# Patient Record
Sex: FEMALE | Race: OTHER
[De-identification: names, ages, dates, MRNs, and addresses within clinical notes are randomized per-mention and may not be internally consistent; named-entity substitution may affect disease eponyms.]

---

## 2020-05-23 ENCOUNTER — HOSPITAL ENCOUNTER (EMERGENCY)
Dept: HOSPITAL 8 - ED | Age: 25
Discharge: HOME | End: 2020-05-23
Payer: MEDICAID

## 2020-05-23 VITALS — BODY MASS INDEX: 26.22 KG/M2 | HEIGHT: 59 IN | WEIGHT: 130.07 LBS

## 2020-05-23 VITALS — SYSTOLIC BLOOD PRESSURE: 116 MMHG | DIASTOLIC BLOOD PRESSURE: 68 MMHG

## 2020-05-23 DIAGNOSIS — R10.9: ICD-10-CM

## 2020-05-23 DIAGNOSIS — A74.9: Primary | ICD-10-CM

## 2020-05-23 DIAGNOSIS — N89.8: ICD-10-CM

## 2020-05-23 DIAGNOSIS — F17.200: ICD-10-CM

## 2020-05-23 DIAGNOSIS — Z20.2: ICD-10-CM

## 2020-05-23 DIAGNOSIS — R30.0: ICD-10-CM

## 2020-05-23 LAB
CLUE CELLS: (no result)
HCG UR SG: 1.02 (ref 1–1.03)
MICROSCOPIC: (no result)
T VAGINALIS RRNA GENITAL QL PROBE: (no result)
WET PREP WBCS: (no result)

## 2020-05-23 PROCEDURE — 99283 EMERGENCY DEPT VISIT LOW MDM: CPT

## 2020-05-23 PROCEDURE — 87491 CHLMYD TRACH DNA AMP PROBE: CPT

## 2020-05-23 PROCEDURE — 87210 SMEAR WET MOUNT SALINE/INK: CPT

## 2020-05-23 PROCEDURE — 81025 URINE PREGNANCY TEST: CPT

## 2020-05-23 PROCEDURE — 96372 THER/PROPH/DIAG INJ SC/IM: CPT

## 2020-05-23 PROCEDURE — 87591 N.GONORRHOEAE DNA AMP PROB: CPT

## 2020-05-23 PROCEDURE — 81003 URINALYSIS AUTO W/O SCOPE: CPT

## 2020-05-23 PROCEDURE — 87808 TRICHOMONAS ASSAY W/OPTIC: CPT

## 2020-12-11 ENCOUNTER — APPOINTMENT (OUTPATIENT)
Dept: RADIOLOGY | Facility: MEDICAL CENTER | Age: 25
End: 2020-12-11
Attending: EMERGENCY MEDICINE
Payer: COMMERCIAL

## 2020-12-11 ENCOUNTER — HOSPITAL ENCOUNTER (EMERGENCY)
Facility: MEDICAL CENTER | Age: 25
End: 2020-12-12
Attending: EMERGENCY MEDICINE
Payer: COMMERCIAL

## 2020-12-11 DIAGNOSIS — R10.30 LOWER ABDOMINAL PAIN: ICD-10-CM

## 2020-12-11 LAB
BASOPHILS # BLD AUTO: 0.6 % (ref 0–1.8)
BASOPHILS # BLD: 0.06 K/UL (ref 0–0.12)
EOSINOPHIL # BLD AUTO: 0.02 K/UL (ref 0–0.51)
EOSINOPHIL NFR BLD: 0.2 % (ref 0–6.9)
ERYTHROCYTE [DISTWIDTH] IN BLOOD BY AUTOMATED COUNT: 43.3 FL (ref 35.9–50)
HCT VFR BLD AUTO: 46.4 % (ref 37–47)
HGB BLD-MCNC: 15.3 G/DL (ref 12–16)
IMM GRANULOCYTES # BLD AUTO: 0.03 K/UL (ref 0–0.11)
IMM GRANULOCYTES NFR BLD AUTO: 0.3 % (ref 0–0.9)
LYMPHOCYTES # BLD AUTO: 2.84 K/UL (ref 1–4.8)
LYMPHOCYTES NFR BLD: 28.9 % (ref 22–41)
MCH RBC QN AUTO: 30.3 PG (ref 27–33)
MCHC RBC AUTO-ENTMCNC: 33 G/DL (ref 33.6–35)
MCV RBC AUTO: 91.9 FL (ref 81.4–97.8)
MONOCYTES # BLD AUTO: 0.48 K/UL (ref 0–0.85)
MONOCYTES NFR BLD AUTO: 4.9 % (ref 0–13.4)
NEUTROPHILS # BLD AUTO: 6.39 K/UL (ref 2–7.15)
NEUTROPHILS NFR BLD: 65.1 % (ref 44–72)
NRBC # BLD AUTO: 0 K/UL
NRBC BLD-RTO: 0 /100 WBC
PLATELET # BLD AUTO: 313 K/UL (ref 164–446)
PMV BLD AUTO: 9.7 FL (ref 9–12.9)
RBC # BLD AUTO: 5.05 M/UL (ref 4.2–5.4)
WBC # BLD AUTO: 9.8 K/UL (ref 4.8–10.8)

## 2020-12-11 PROCEDURE — 96375 TX/PRO/DX INJ NEW DRUG ADDON: CPT

## 2020-12-11 PROCEDURE — 80053 COMPREHEN METABOLIC PANEL: CPT

## 2020-12-11 PROCEDURE — 84703 CHORIONIC GONADOTROPIN ASSAY: CPT

## 2020-12-11 PROCEDURE — 83690 ASSAY OF LIPASE: CPT

## 2020-12-11 PROCEDURE — 85025 COMPLETE CBC W/AUTO DIFF WBC: CPT

## 2020-12-11 PROCEDURE — 96374 THER/PROPH/DIAG INJ IV PUSH: CPT

## 2020-12-11 PROCEDURE — 700111 HCHG RX REV CODE 636 W/ 250 OVERRIDE (IP): Performed by: EMERGENCY MEDICINE

## 2020-12-11 PROCEDURE — 99284 EMERGENCY DEPT VISIT MOD MDM: CPT

## 2020-12-11 RX ORDER — ONDANSETRON 2 MG/ML
4 INJECTION INTRAMUSCULAR; INTRAVENOUS ONCE
Status: COMPLETED | OUTPATIENT
Start: 2020-12-12 | End: 2020-12-11

## 2020-12-11 RX ADMIN — FENTANYL CITRATE 50 MCG: 50 INJECTION, SOLUTION INTRAMUSCULAR; INTRAVENOUS at 23:59

## 2020-12-11 RX ADMIN — ONDANSETRON 4 MG: 2 INJECTION INTRAMUSCULAR; INTRAVENOUS at 23:59

## 2020-12-12 VITALS
WEIGHT: 124.78 LBS | OXYGEN SATURATION: 98 % | SYSTOLIC BLOOD PRESSURE: 128 MMHG | HEART RATE: 61 BPM | DIASTOLIC BLOOD PRESSURE: 81 MMHG | RESPIRATION RATE: 17 BRPM | TEMPERATURE: 97.9 F | HEIGHT: 59 IN | BODY MASS INDEX: 25.16 KG/M2

## 2020-12-12 LAB
ALBUMIN SERPL BCP-MCNC: 4.9 G/DL (ref 3.2–4.9)
ALBUMIN/GLOB SERPL: 1.5 G/DL
ALP SERPL-CCNC: 106 U/L (ref 30–99)
ALT SERPL-CCNC: 33 U/L (ref 2–50)
ANION GAP SERPL CALC-SCNC: 12 MMOL/L (ref 7–16)
APPEARANCE UR: CLEAR
AST SERPL-CCNC: 23 U/L (ref 12–45)
BILIRUB SERPL-MCNC: 0.5 MG/DL (ref 0.1–1.5)
BUN SERPL-MCNC: 13 MG/DL (ref 8–22)
CALCIUM SERPL-MCNC: 9.8 MG/DL (ref 8.5–10.5)
CHLORIDE SERPL-SCNC: 101 MMOL/L (ref 96–112)
CO2 SERPL-SCNC: 24 MMOL/L (ref 20–33)
COLOR UR AUTO: YELLOW
CREAT SERPL-MCNC: 0.66 MG/DL (ref 0.5–1.4)
GLOBULIN SER CALC-MCNC: 3.3 G/DL (ref 1.9–3.5)
GLUCOSE SERPL-MCNC: 87 MG/DL (ref 65–99)
GLUCOSE UR QL STRIP.AUTO: NEGATIVE MG/DL
HCG SERPL QL: NEGATIVE
KETONES UR QL STRIP.AUTO: 40 MG/DL
LEUKOCYTE ESTERASE UR QL STRIP.AUTO: NEGATIVE
LIPASE SERPL-CCNC: 40 U/L (ref 11–82)
NITRITE UR QL STRIP.AUTO: NEGATIVE
PH UR STRIP.AUTO: 5.5 [PH] (ref 5–8)
POTASSIUM SERPL-SCNC: 3.9 MMOL/L (ref 3.6–5.5)
PROT SERPL-MCNC: 8.2 G/DL (ref 6–8.2)
PROT UR QL STRIP: NEGATIVE MG/DL
RBC UR QL AUTO: ABNORMAL
SODIUM SERPL-SCNC: 137 MMOL/L (ref 135–145)
SP GR UR STRIP.AUTO: 1.01 (ref 1–1.03)

## 2020-12-12 PROCEDURE — 700117 HCHG RX CONTRAST REV CODE 255: Performed by: EMERGENCY MEDICINE

## 2020-12-12 PROCEDURE — 81002 URINALYSIS NONAUTO W/O SCOPE: CPT

## 2020-12-12 PROCEDURE — 74177 CT ABD & PELVIS W/CONTRAST: CPT

## 2020-12-12 RX ADMIN — IOHEXOL 70 ML: 350 INJECTION, SOLUTION INTRAVENOUS at 01:01

## 2020-12-12 NOTE — ED NOTES
Patient reporting improvement after medications, ambulatory to the bathroom with steady gait.  Urine sample collected

## 2020-12-12 NOTE — ED PROVIDER NOTES
ED Provider Note    CHIEF COMPLAINT  Chief Complaint   Patient presents with   • Abdominal Pain     diffuse; x2 days   • Nausea       HPI  Jyotsna Tellez is a 25 y.o. female who presents with abdominal pain.  The patient said suprapubic abdominal pain over last couple of days.  She has associated nausea but no vomiting.  She has not had any diarrhea nor constipation.  She denies difficulty with urination.  She has not had any fevers.  She is unaware of any sick contacts and she does not have any difficulty with breathing.  The pain is mild in intensity and worse with eating.    REVIEW OF SYSTEMS  See HPI for further details. All other systems are negative.     PAST MEDICAL HISTORY  No past medical history on file.    FAMILY HISTORY  [unfilled]    SOCIAL HISTORY  Social History     Socioeconomic History   • Marital status: Not on file     Spouse name: Not on file   • Number of children: Not on file   • Years of education: Not on file   • Highest education level: Not on file   Occupational History   • Not on file   Social Needs   • Financial resource strain: Not on file   • Food insecurity     Worry: Not on file     Inability: Not on file   • Transportation needs     Medical: Not on file     Non-medical: Not on file   Tobacco Use   • Smoking status: Current Every Day Smoker     Packs/day: 0.25     Types: Cigarettes   • Smokeless tobacco: Never Used   Substance and Sexual Activity   • Alcohol use: Not Currently   • Drug use: Not Currently   • Sexual activity: Not on file   Lifestyle   • Physical activity     Days per week: Not on file     Minutes per session: Not on file   • Stress: Not on file   Relationships   • Social connections     Talks on phone: Not on file     Gets together: Not on file     Attends Rastafarian service: Not on file     Active member of club or organization: Not on file     Attends meetings of clubs or organizations: Not on file     Relationship status: Not on file   • Intimate partner violence      "Fear of current or ex partner: Not on file     Emotionally abused: Not on file     Physically abused: Not on file     Forced sexual activity: Not on file   Other Topics Concern   • Not on file   Social History Narrative   • Not on file       SURGICAL HISTORY  No past surgical history on file.    CURRENT MEDICATIONS  Home Medications    **Home medications have not yet been reviewed for this encounter**         ALLERGIES  Allergies   Allergen Reactions   • Morphine Rash     Rash       PHYSICAL EXAM  VITAL SIGNS: /84   Pulse 70   Temp 36.5 °C (97.7 °F) (Temporal)   Resp 16   Ht 1.499 m (4' 11\")   Wt 56.6 kg (124 lb 12.5 oz)   LMP 11/30/2020 (Approximate)   SpO2 98%   BMI 25.20 kg/m²       Constitutional: Well developed, Well nourished, No acute distress, Non-toxic appearance.   HENT: Normocephalic, Atraumatic, Bilateral external ears normal, Oropharynx moist, No oral exudates, Nose normal.   Eyes: PERRLA, EOMI, Conjunctiva normal, No discharge.   Neck: Normal range of motion, No tenderness, Supple, No stridor.   Lymphatic: No lymphadenopathy noted.   Cardiovascular: Normal heart rate, Normal rhythm, No murmurs, No rubs, No gallops.   Thorax & Lungs: Normal breath sounds, No respiratory distress, No wheezing, No chest tenderness.   Abdomen: Bowel sounds normal, Soft, suprapubic tenderness, No masses, No pulsatile masses.   Skin: Warm, Dry, No erythema, No rash.   Back: No tenderness, No CVA tenderness.   Extremities: Intact distal pulses, No edema, No tenderness, No cyanosis, No clubbing.    Neurologic: Alert & oriented x 3, Normal motor function, Normal sensory function, No focal deficits noted.   Psychiatric: Affect normal, Judgment normal, Mood normal.     RADIOLOGY/PROCEDURES  CT-ABDOMEN-PELVIS WITH   Final Result      Unremarkable contrast-enhanced CT of the abdomen and pelvis.            COURSE & MEDICAL DECISION MAKING  Pertinent Labs & Imaging studies reviewed. (See chart for details)  This a " 25-year-old female who presents the emergency department with abdominal discomfort.  CT scan does not show any evidence of intra-abdominal inflammation.  Urine dip does not show any evidence of an urinary tract infection.  And I have a clear source of her abdominal pain.  The patient received fentanyl and Zofran with some relief.  The patient will be discharged with instructions take Tylenol as needed for pain control and focus on hydration.  If she is acutely worse she will return for repeat examination.    FINAL IMPRESSION  1.  Abdominal pain    Disposition  The patient will be discharged in stable condition    Electronically signed by: Cyril Marte M.D., 12/11/2020 11:35 PM

## 2020-12-12 NOTE — ED NOTES
Patient verbalizes understanding of follow up, pain management, care and when to return to the ED.  Discharged with friend.  All questions answered

## 2020-12-12 NOTE — ED TRIAGE NOTES
Jyotsna Tellez  25 y.o.  Chief Complaint   Patient presents with   • Abdominal Pain     diffuse; x2 days   • Nausea       Pt placed out in the lobby and educated on triage process. Pt advised to let staff know of any changes.

## 2021-03-18 ENCOUNTER — HOSPITAL ENCOUNTER (EMERGENCY)
Facility: MEDICAL CENTER | Age: 26
End: 2021-03-18
Attending: EMERGENCY MEDICINE
Payer: COMMERCIAL

## 2021-03-18 VITALS
WEIGHT: 117.95 LBS | TEMPERATURE: 98.5 F | HEART RATE: 75 BPM | OXYGEN SATURATION: 98 % | DIASTOLIC BLOOD PRESSURE: 85 MMHG | RESPIRATION RATE: 16 BRPM | BODY MASS INDEX: 23.78 KG/M2 | SYSTOLIC BLOOD PRESSURE: 130 MMHG | HEIGHT: 59 IN

## 2021-03-18 DIAGNOSIS — R59.0 CERVICAL LYMPHADENOPATHY: ICD-10-CM

## 2021-03-18 DIAGNOSIS — J02.8 PHARYNGITIS DUE TO OTHER ORGANISM: ICD-10-CM

## 2021-03-18 LAB
HCG UR QL: NEGATIVE
S PYO DNA SPEC NAA+PROBE: NOT DETECTED

## 2021-03-18 PROCEDURE — 700111 HCHG RX REV CODE 636 W/ 250 OVERRIDE (IP): Performed by: EMERGENCY MEDICINE

## 2021-03-18 PROCEDURE — A9270 NON-COVERED ITEM OR SERVICE: HCPCS | Performed by: EMERGENCY MEDICINE

## 2021-03-18 PROCEDURE — 81025 URINE PREGNANCY TEST: CPT

## 2021-03-18 PROCEDURE — 99283 EMERGENCY DEPT VISIT LOW MDM: CPT

## 2021-03-18 PROCEDURE — 87651 STREP A DNA AMP PROBE: CPT

## 2021-03-18 PROCEDURE — 700102 HCHG RX REV CODE 250 W/ 637 OVERRIDE(OP): Performed by: EMERGENCY MEDICINE

## 2021-03-18 RX ORDER — ACETAMINOPHEN 500 MG
1000 TABLET ORAL ONCE
Status: COMPLETED | OUTPATIENT
Start: 2021-03-18 | End: 2021-03-18

## 2021-03-18 RX ORDER — IBUPROFEN 600 MG/1
600 TABLET ORAL ONCE
Status: COMPLETED | OUTPATIENT
Start: 2021-03-18 | End: 2021-03-18

## 2021-03-18 RX ORDER — AMOXICILLIN 500 MG/1
500 CAPSULE ORAL ONCE
Status: COMPLETED | OUTPATIENT
Start: 2021-03-18 | End: 2021-03-18

## 2021-03-18 RX ORDER — DEXAMETHASONE SODIUM PHOSPHATE 10 MG/ML
10 INJECTION, SOLUTION INTRAMUSCULAR; INTRAVENOUS ONCE
Status: COMPLETED | OUTPATIENT
Start: 2021-03-18 | End: 2021-03-18

## 2021-03-18 RX ORDER — AMOXICILLIN 500 MG/1
1000 CAPSULE ORAL DAILY
Qty: 20 CAPSULE | Refills: 0 | Status: SHIPPED | OUTPATIENT
Start: 2021-03-18 | End: 2021-03-28

## 2021-03-18 RX ADMIN — ACETAMINOPHEN 1000 MG: 500 TABLET, FILM COATED ORAL at 05:18

## 2021-03-18 RX ADMIN — AMOXICILLIN 500 MG: 500 CAPSULE ORAL at 05:18

## 2021-03-18 RX ADMIN — DEXAMETHASONE SODIUM PHOSPHATE 10 MG: 10 INJECTION INTRAMUSCULAR; INTRAVENOUS at 05:18

## 2021-03-18 RX ADMIN — IBUPROFEN 600 MG: 600 TABLET, FILM COATED ORAL at 05:18

## 2021-03-18 ASSESSMENT — PAIN DESCRIPTION - PAIN TYPE
TYPE: ACUTE PAIN
TYPE: ACUTE PAIN

## 2021-03-18 ASSESSMENT — FIBROSIS 4 INDEX: FIB4 SCORE: 0.32

## 2021-03-18 NOTE — DISCHARGE INSTRUCTIONS
You are seen in the emergency department for sore throat and swollen lymph nodes.  You may have an early peritonsillar abscess, however at this point it does not appear to be large enough to drain.  You are being started on antibiotics for this.    Please follow-up closely with your regular doctor.    Please return to the emergency department or seek medical attention if you develop:  Difficulty swallowing, difficulty breathing, ongoing fevers, any other new or concerning findings

## 2021-03-18 NOTE — ED NOTES
DC instructions reviewed with pt. Pt verbalized understanding. Pt ambulated to [facility] with steady gait.

## 2021-03-18 NOTE — ED TRIAGE NOTES
"Jyotsna Tellez   25 y.o. female   Chief Complaint   Patient presents with   • Neck Swelling     for a week, more so on the left side, slight redness and swelling noted to oropharynx. reports some difficulty swallowing and breathing due to the swelling, speaking in perfect complete sentences.       Pt amb to triage with steady gait for above complaint.      Pt is alert and oriented, speaking in full sentences, follows commands and responds appropriately to questions. NAD. Resp are even and unlabored.   Pt placed in lobby. Pt educated on triage process. Pt encouraged to alert staff for any changes.    /78   Pulse 90   Temp 36.6 °C (97.9 °F) (Temporal)   Resp 20   Ht 1.499 m (4' 11\")   Wt 53.5 kg (117 lb 15.1 oz)   LMP 02/08/2021   SpO2 97%   BMI 23.82 kg/m²     "

## 2021-03-18 NOTE — ED PROVIDER NOTES
ED Provider Note    Scribed for Moreno Whitley M.D. by Audrey Mirza. 3/18/2021,  1:24 AM.    Means of Arrival: Walk in  History obtained from: Patient  History limited by: None    CHIEF COMPLAINT  Chief Complaint   Patient presents with    Neck Swelling     for a week, more so on the left side, slight redness and swelling noted to oropharynx. reports some difficulty swallowing and breathing due to the swelling, speaking in perfect complete sentences.        HPI  Jyotsna Tellez is a 25 y.o. female who presents to the Emergency Department with moderate neck swelling onset 1 week ago. Per the patient, her mother, brother, and sister are also experiencing similar symptoms; they all have thyroid issues and have a family history of thyroid cancer. She states that she had some neck swelling a couple of months ago but that this resolved on its own. She reports additional symptoms of left ear pain (onset 2 days ago) and intermittent fever. Eating hard foods and cold drinks exacerbates her pain; no other alleviating factors were noted. She denies history of diabetes.     REVIEW OF SYSTEMS  CONSTITUTIONAL:  Intermittent fever.  MUSCULOSKELETAL:  Left ear pain  ENDOCRINE:  Neck swelling  See HPI for further details.     PAST MEDICAL HISTORY  History reviewed. No pertinent past medical history.    FAMILY HISTORY  None pertinent    SOCIAL HISTORY   reports that she has been smoking cigarettes. She has been smoking about 0.25 packs per day. She has never used smokeless tobacco. She reports previous alcohol use. She reports previous drug use.    SURGICAL HISTORY  History reviewed. No pertinent surgical history.    CURRENT MEDICATIONS  Home Medications       Reviewed by Leonel Regalado R.N. (Registered Nurse) on 03/18/21 at 0102  Med List Status: Not Addressed     Medication Last Dose Status        Patient David Taking any Medications                           ALLERGIES  Allergies   Allergen Reactions    Morphine Rash      "Rash       PHYSICAL EXAM  VITAL SIGNS: /78   Pulse 90   Temp 36.6 °C (97.9 °F) (Temporal)   Resp 20   Ht 1.499 m (4' 11\")   Wt 53.5 kg (117 lb 15.1 oz)   LMP 02/08/2021   SpO2 97%   BMI 23.82 kg/m²    Gen: Alert, no acute distress  HEENT: ATNC. Partial cerumen obstruction on the left. Thickening of bilateral tympanic membrane, no erythema or bulging. Mild erythema of oropharynx, Slight left peritonsillar swelling.   Eyes: PERRL, EOMI, normal conjunctiva.   Neck: trachea midline. Left anterior cervical lymphadenopathy.   Resp: no respiratory distress  CV: No JVD  Abd: non-distended  Ext: No deformities  Psych: normal mood  Neuro: speech fluent     DIAGNOSTIC STUDIES / PROCEDURES     LABS  Labs Reviewed   GROUP A STREP BY PCR    Narrative:     Release to patient->Immediate   HCG QUALITATIVE UR    Narrative:     Release to patient->Immediate     All labs reviewed by me.    COURSE & MEDICAL DECISION MAKING  Pertinent Labs & Imaging studies reviewed. (See chart for details)    1:24 AM Patient seen and examined at bedside. Ordered for labs to evaluate. Patient will be treated with amoxicillin 500 mg for her symptoms.    3:59 AM Recheck: Patient re-evaluated at bedside. Patient reports feeling well. Discussed patient's condition and treatment plan, including plans for discharge. Discharge instructions and return precautions were given. The patient understood and is in agreement. The patient was given an opportunity to ask questions.     4:09 AM Patient treated with Decadron 10 mg, Tylenol 1000 mg, and Motrin 600 mg.     Medical Decision Making:  patient has left anterior cervical lymphadenopathy. She does have some slight swelling of the oropharynx on left hand side, however not large enough to be amenable to  abscess drainage. I suspect leg monitor versus a small abscess. CAT scan was offered to the patient, and using shared decision-making, she declined. We will treat with antibiotics, a dose of " dexamethasone to help reduce swelling. She was given instructions on pain management, return precautions, and anticipatory guidance.    PPE Note: I personally donned appropriate PPE for all patient encounters during this visit, including wearing a mask, gloves, and eye protection. Scribe remained outside the patient's room and did not have any contact with the patient for the duration of patient encounter.      The patient will return for new or worsening symptoms and is stable at the time of discharge.    The patient is referred to a primary physician for blood pressure management, diabetic screening, and for all other preventative health concerns.    DISPOSITION:  Patient will be discharged home in stable condition.    FOLLOW UP:  [facility], Emergency Dept  [address] [id number] [phone]    If symptoms worsen    Your regular doctor.  To establish a primary care provider within our system, please call [phone]    Schedule an appointment as soon as possible for a visit       OUTPATIENT MEDICATIONS:  Discharge Medication List as of 3/18/2021  5:21 AM        START taking these medications    Details   amoxicillin (AMOXIL) 500 MG Cap Take 2 Capsules by mouth every day for 10 days., Disp-20 capsule, R-0, Normal             FINAL IMPRESSION  1. Pharyngitis due to other organism    2. Cervical lymphadenopathy            Audrey JULIEN (Juan Jose), am scribing for, and in the presence of, Moreno Whitley M.D..    Electronically signed by: Audrey Mirza (Vaishaliibsaul), 3/18/2021    IMoreno M.D. personally performed the services described in this documentation, as scribed by Audrey Mirza in my presence, and it is both accurate and complete.    E    The note accurately reflects work and decisions made by me.  Moreno Whitley M.D.  3/18/2021  6:31 AM

## 2021-06-30 ENCOUNTER — HOSPITAL ENCOUNTER (EMERGENCY)
Facility: MEDICAL CENTER | Age: 26
End: 2021-06-30
Attending: EMERGENCY MEDICINE
Payer: COMMERCIAL

## 2021-06-30 VITALS
BODY MASS INDEX: 24.98 KG/M2 | WEIGHT: 123.9 LBS | RESPIRATION RATE: 14 BRPM | HEART RATE: 78 BPM | DIASTOLIC BLOOD PRESSURE: 70 MMHG | HEIGHT: 59 IN | SYSTOLIC BLOOD PRESSURE: 110 MMHG | OXYGEN SATURATION: 98 % | TEMPERATURE: 97.1 F

## 2021-06-30 DIAGNOSIS — K08.89 PAIN, DENTAL: ICD-10-CM

## 2021-06-30 DIAGNOSIS — K04.7 DENTAL ABSCESS: ICD-10-CM

## 2021-06-30 PROCEDURE — 700102 HCHG RX REV CODE 250 W/ 637 OVERRIDE(OP): Performed by: EMERGENCY MEDICINE

## 2021-06-30 PROCEDURE — 99283 EMERGENCY DEPT VISIT LOW MDM: CPT

## 2021-06-30 PROCEDURE — A9270 NON-COVERED ITEM OR SERVICE: HCPCS | Performed by: EMERGENCY MEDICINE

## 2021-06-30 RX ORDER — HYDROCODONE BITARTRATE AND ACETAMINOPHEN 5; 325 MG/1; MG/1
1 TABLET ORAL ONCE
Status: COMPLETED | OUTPATIENT
Start: 2021-06-30 | End: 2021-06-30

## 2021-06-30 RX ORDER — PENICILLIN V POTASSIUM 500 MG/1
500 TABLET ORAL ONCE
Status: COMPLETED | OUTPATIENT
Start: 2021-06-30 | End: 2021-06-30

## 2021-06-30 RX ORDER — PENICILLIN V POTASSIUM 500 MG/1
500 TABLET ORAL
Qty: 40 TABLET | Refills: 0 | Status: SHIPPED | OUTPATIENT
Start: 2021-06-30 | End: 2022-06-08

## 2021-06-30 RX ADMIN — HYDROCODONE BITARTRATE AND ACETAMINOPHEN 1 TABLET: 5; 325 TABLET ORAL at 15:45

## 2021-06-30 RX ADMIN — PENICILLIN V POTASSIUM 500 MG: 500 TABLET, FILM COATED ORAL at 15:44

## 2021-06-30 ASSESSMENT — FIBROSIS 4 INDEX: FIB4 SCORE: 0.32

## 2021-06-30 NOTE — ED NOTES
Patient walked with a steady gate at this time to er [facility] area room 77. gave warm blanket, and call light. Ready to be seen  rn is at bedside

## 2021-06-30 NOTE — ED TRIAGE NOTES
"PT ambulated to triage c/o dental pain x 3 days  Chief Complaint   Patient presents with   • Dental Pain     /70   Pulse 82   Temp 36.2 °C (97.1 °F) (Temporal)   Resp 14   Ht 1.499 m (4' 11\")   Wt 56.2 kg (123 lb 14.4 oz)   SpO2 98%     "

## 2021-06-30 NOTE — ED PROVIDER NOTES
"ED Provider Note    CHIEF COMPLAINT  Chief Complaint   Patient presents with   • Dental Pain       HPI  Jyotsna Tellez is a 25 y.o. female who presents to the emergency department left lower dental pain. Getting progressively worse over the last couple days excruciating tonight unable to tolerate. Worse with mastication slightly better with over-the-counter analgesics. Minor chills no measured fever minor nausea no emesis. No difficulty swallowing or breathing no swelling no drainage.      REVIEW OF SYSTEMS  Positive for dental pain negative for swelling fluctuance or drainage fevers chills difficulty swallowing or breathing      PAST MEDICAL HISTORY  History reviewed. No pertinent past medical history.      CURRENT MEDICATIONS  Home Medications     Reviewed by Darlene Samano R.N. (Registered Nurse) on 06/30/21 at 1425  Med List Status: Not Addressed   Medication Last Dose Status        Patient David Taking any Medications                       ALLERGIES  Allergies   Allergen Reactions   • Morphine Rash     Rash       PHYSICAL EXAM  VITAL SIGNS: /70   Pulse 82   Temp 36.2 °C (97.1 °F) (Temporal)   Resp 14   Ht 1.499 m (4' 11\")   Wt 56.2 kg (123 lb 14.4 oz)   LMP 06/15/2021 (Approximate)   SpO2 98%   BMI 25.02 kg/m²   Constitutional: Alert and oriented x 3, moderate distress.  HENT: Normocephalic, Atraumatic, Bilateral external ears normal. Nose normal. Poor dentition throughout irreversible pulpitis of tooth number 20, minor surrounding periodontal erythema and edema no fluctuance no drainage  Eyes: Pupils are equal and reactive. Conjunctiva normal, non-icteric.   Heart: Regular rate and rythm, no murmurs, equal pulses peripherally x 4.    Lungs: Clear to auscultation bilaterally, no wheezes rales or rhonchi  GI: Soft nontender nondistended positive bowel sounds.  Skin: Warm, Dry, No erythema, No rash.   Neurologic: Cranial nerves III through XII grossly intact no sensory deficit no cerebellar " dysfunction.   Psychiatric: Appropriate affect for situation        COURSE & MEDICAL DECISION MAKING  Nursing notes, VS, PMSFHx reviewed in chart.      Medical Decision Making:  This patient presents with acute dentalgia. The patient is not hypoxic and I see no evidence of Ian's angina, peritonsillar abscess, epiglottitis, or retropharyngeal abscess. Patient given 1 dose of Norco for pain control. Given penicillin 1st dose here and prescription for the same and advised follow-up with dentistry at the next available time return here for any worsening pain swelling drainage difficulty swallowing breathing or other acute concerns. Discharged home in stable condition.    Prescription monitoring program. And unremarkable. Advised follow-up with primary care in regards to elevated blood pressure this evening      FINAL IMPRESSION  1. Pain, dental Active   2. Dental abscess Active       Electronically signed by: Zander Brown M.D., 6/30/2021 3:30 PM

## 2021-10-29 ENCOUNTER — APPOINTMENT (OUTPATIENT)
Dept: RADIOLOGY | Facility: MEDICAL CENTER | Age: 26
End: 2021-10-29
Attending: EMERGENCY MEDICINE
Payer: MEDICAID

## 2021-10-29 ENCOUNTER — HOSPITAL ENCOUNTER (EMERGENCY)
Facility: MEDICAL CENTER | Age: 26
End: 2021-10-30
Attending: EMERGENCY MEDICINE
Payer: MEDICAID

## 2021-10-29 ENCOUNTER — APPOINTMENT (OUTPATIENT)
Dept: RADIOLOGY | Facility: MEDICAL CENTER | Age: 26
End: 2021-10-29
Payer: MEDICAID

## 2021-10-29 DIAGNOSIS — J06.9 UPPER RESPIRATORY TRACT INFECTION, UNSPECIFIED TYPE: ICD-10-CM

## 2021-10-29 LAB
ALBUMIN SERPL BCP-MCNC: 4.6 G/DL (ref 3.2–4.9)
ALBUMIN/GLOB SERPL: 1.3 G/DL
ALP SERPL-CCNC: 152 U/L (ref 30–99)
ALT SERPL-CCNC: 53 U/L (ref 2–50)
ANION GAP SERPL CALC-SCNC: 11 MMOL/L (ref 7–16)
AST SERPL-CCNC: 27 U/L (ref 12–45)
BASOPHILS # BLD AUTO: 0.5 % (ref 0–1.8)
BASOPHILS # BLD: 0.05 K/UL (ref 0–0.12)
BILIRUB SERPL-MCNC: 0.4 MG/DL (ref 0.1–1.5)
BUN SERPL-MCNC: 13 MG/DL (ref 8–22)
CALCIUM SERPL-MCNC: 9.7 MG/DL (ref 8.5–10.5)
CHLORIDE SERPL-SCNC: 102 MMOL/L (ref 96–112)
CO2 SERPL-SCNC: 24 MMOL/L (ref 20–33)
CREAT SERPL-MCNC: 0.62 MG/DL (ref 0.5–1.4)
EOSINOPHIL # BLD AUTO: 0.09 K/UL (ref 0–0.51)
EOSINOPHIL NFR BLD: 1 % (ref 0–6.9)
ERYTHROCYTE [DISTWIDTH] IN BLOOD BY AUTOMATED COUNT: 43.2 FL (ref 35.9–50)
GLOBULIN SER CALC-MCNC: 3.6 G/DL (ref 1.9–3.5)
GLUCOSE SERPL-MCNC: 85 MG/DL (ref 65–99)
HCG SERPL QL: NEGATIVE
HCT VFR BLD AUTO: 45.2 % (ref 37–47)
HGB BLD-MCNC: 15.2 G/DL (ref 12–16)
IMM GRANULOCYTES # BLD AUTO: 0.03 K/UL (ref 0–0.11)
IMM GRANULOCYTES NFR BLD AUTO: 0.3 % (ref 0–0.9)
LYMPHOCYTES # BLD AUTO: 3.03 K/UL (ref 1–4.8)
LYMPHOCYTES NFR BLD: 33.1 % (ref 22–41)
MCH RBC QN AUTO: 30.7 PG (ref 27–33)
MCHC RBC AUTO-ENTMCNC: 33.6 G/DL (ref 33.6–35)
MCV RBC AUTO: 91.3 FL (ref 81.4–97.8)
MONOCYTES # BLD AUTO: 0.58 K/UL (ref 0–0.85)
MONOCYTES NFR BLD AUTO: 6.3 % (ref 0–13.4)
NEUTROPHILS # BLD AUTO: 5.37 K/UL (ref 2–7.15)
NEUTROPHILS NFR BLD: 58.8 % (ref 44–72)
NRBC # BLD AUTO: 0 K/UL
NRBC BLD-RTO: 0 /100 WBC
PLATELET # BLD AUTO: 369 K/UL (ref 164–446)
PMV BLD AUTO: 9.1 FL (ref 9–12.9)
POTASSIUM SERPL-SCNC: 4.3 MMOL/L (ref 3.6–5.5)
PROT SERPL-MCNC: 8.2 G/DL (ref 6–8.2)
RBC # BLD AUTO: 4.95 M/UL (ref 4.2–5.4)
SODIUM SERPL-SCNC: 137 MMOL/L (ref 135–145)
WBC # BLD AUTO: 9.2 K/UL (ref 4.8–10.8)

## 2021-10-29 PROCEDURE — 93005 ELECTROCARDIOGRAM TRACING: CPT | Performed by: EMERGENCY MEDICINE

## 2021-10-29 PROCEDURE — 36415 COLL VENOUS BLD VENIPUNCTURE: CPT

## 2021-10-29 PROCEDURE — 80053 COMPREHEN METABOLIC PANEL: CPT

## 2021-10-29 PROCEDURE — 71045 X-RAY EXAM CHEST 1 VIEW: CPT

## 2021-10-29 PROCEDURE — U0005 INFEC AGEN DETEC AMPLI PROBE: HCPCS

## 2021-10-29 PROCEDURE — 84703 CHORIONIC GONADOTROPIN ASSAY: CPT

## 2021-10-29 PROCEDURE — 99283 EMERGENCY DEPT VISIT LOW MDM: CPT

## 2021-10-29 PROCEDURE — 85025 COMPLETE CBC W/AUTO DIFF WBC: CPT

## 2021-10-29 PROCEDURE — U0003 INFECTIOUS AGENT DETECTION BY NUCLEIC ACID (DNA OR RNA); SEVERE ACUTE RESPIRATORY SYNDROME CORONAVIRUS 2 (SARS-COV-2) (CORONAVIRUS DISEASE [COVID-19]), AMPLIFIED PROBE TECHNIQUE, MAKING USE OF HIGH THROUGHPUT TECHNOLOGIES AS DESCRIBED BY CMS-2020-01-R: HCPCS

## 2021-10-29 ASSESSMENT — FIBROSIS 4 INDEX: FIB4 SCORE: 0.33

## 2021-10-30 VITALS
HEART RATE: 64 BPM | RESPIRATION RATE: 20 BRPM | OXYGEN SATURATION: 97 % | DIASTOLIC BLOOD PRESSURE: 61 MMHG | WEIGHT: 123 LBS | BODY MASS INDEX: 24.8 KG/M2 | SYSTOLIC BLOOD PRESSURE: 110 MMHG | HEIGHT: 59 IN | TEMPERATURE: 97.1 F

## 2021-10-30 LAB
EKG IMPRESSION: NORMAL
SARS-COV-2 RNA RESP QL NAA+PROBE: NOTDETECTED
SPECIMEN SOURCE: NORMAL

## 2021-10-30 NOTE — DISCHARGE INSTRUCTIONS
Your COVID-19 test has been sent to the lab and results are pending.  Results should be posted to your [employer] account tomorrow.  Please quarantine per CDC guidelines until you get your results back.  It is recommended that you quarantine regardless even if you have symptoms as sometimes false negative results occur.    Return to the ER for any worsening cough, changing cough, coughing of rust colored phlegm or blood, swelling to your legs or ankles, fevers over 100.4, shaking chills, muscle aches or body aches, dizziness or lightheadedness, chest pain, vomiting, diarrhea, shortness of breath with exertion, dizziness or lightheadedness with exertion, or for any concerns/worsening.    Follow-up with your primary care physician within the next 2 to 3 days.  Please call for appointment.  If you do not have a primary care doctor, follow-up at the [facility] clinic within the next 2 to 3 days.  Please call for appointment.

## 2021-10-30 NOTE — ED TRIAGE NOTES
Chief Complaint   Patient presents with   • Shortness of Breath   • Flu Like Symptoms     SOB, cough, and runny nose x 2 weeks. +COVID  08/2021, no negative test since.

## 2021-10-30 NOTE — ED PROVIDER NOTES
ED Provider Note    Scribed for Chinyere Ramirez M.D. by Stacey Navarro. 10/29/2021  8:58 PM    Primary care provider: None noted  Means of arrival: Walk-In  History obtained from: Patient  History limited by: None    CHIEF COMPLAINT  Chief Complaint   Patient presents with   • Shortness of Breath   • Flu Like Symptoms     HPI  Jyotsna Tellez is a 26 y.o. female who presents with cough, nasal congestion, and chest pain with cough.  The patient currently exhibits mild associated shortness of breath, chest pain secondary to cough, tactile fever, and runny nose onset 2 weeks ago. The patient was diagnosed Covid positive in August 2021.  She believes she got over her COVID-19.  2 weeks ago she started having runny nose and cough again.  She states that her cough and runny nose eventually subsided for 3 days, but now returned about a week ago. She is not vaccinated for Covid-19.  She says she feels a lot of drainage down the back of her throat.  When she blows her nose its occasionally green.  No coughing of blood.  She has chest pain, but only with cough.  No chest pain if she is not coughing.  Her cough is not bloody or productive. She denies body aches, abdominal pain, leg swelling, loss of smell or taste, nausea, or emesis. She is not currently on birth control, and states her last menstrual period was one month ago. She denies any known sick contact. She is not short of breath or endorsing chest pain while sitting in the ED. No one in her current house hold is vaccinated for Covid-19.    REVIEW OF SYSTEMS  Pertinent positives include shortness of breath, chest pain secondary to cough, sore throat, cough, tactile fever, and runny nose.  Pertinent negatives include body aches, loss of smell or taste, nausea, or emesis.  See HPI for further details. All other systems are negative.    PAST MEDICAL HISTORY  History reviewed. No pertinent past medical history.    FAMILY HISTORY  History reviewed. No pertinent family  "history.    SOCIAL HISTORY  Social History     Socioeconomic History   • Marital status: Single     Spouse name:    • Number of children:    • Years of education:    • Highest education level:    Occupational History   • Not on file   Tobacco Use   • Smoking status: Current Every Day Smoker     Packs/day: 0.25     Types: Cigarettes   • Smokeless tobacco: Never Used   Vaping Use   • Vaping Use: Never used   Substance and Sexual Activity   • Alcohol use: Yes     Comment: occ   • Drug use: Yes     Types: Inhaled     Comment: marijuana          Social Determinants of Health     SURGICAL HISTORY  History reviewed. No pertinent surgical history.    CURRENT MEDICATIONS  Home Medications     Reviewed by Nel Nix R.N. (Registered Nurse) on 10/29/21 at 2036  Med List Status: Partial   Medication Last Dose Status   penicillin v potassium (VEETID) 500 MG Tab  Active              ALLERGIES  Allergies   Allergen Reactions   • Morphine Rash     Rash       PHYSICAL EXAM  VITAL SIGNS: /79   Pulse 80   Temp 35.9 °C (96.7 °F) (Temporal)   Resp 17   Ht 1.499 m (4' 11\")   Wt 55.8 kg (123 lb)   LMP 09/11/2021   SpO2 98%   BMI 24.84 kg/m²    Constitutional: Well developed, well nourished; No acute distress; Non-toxic appearance.  Occasional cough.  HENT: Normocephalic, atraumatic; Bilateral external ears normal; oropharyngeal examination deferred due to COVID-19 outbreak and lack of oropharyngeal complaint.  Eyes: PERRL, EOMI, Conjunctiva normal. No discharge.   Neck:  Supple, nontender midline; No stridor; No nuchal rigidity.   Lymphatic: No cervical lymphadenopathy noted.   Cardiovascular: Regular rate and rhythm without murmurs, rubs, or gallop.   Thorax & Lungs: No respiratory distress, breath sounds clear to auscultation bilaterally without wheezing, rales or rhonchi. Nontender chest wall. No crepitus or subcutaneous air  Abdomen: Soft, nontender, bowel sounds normal. No obvious masses; No pulsatile masses; " no rebound, guarding, or peritoneal signs.   Skin: Good color; warm and dry without rash or petechia.  Back: Nontender, No CVA tenderness.   Extremities: Distal radial, dorsalis pedis, posterior tibial pulses are equal bilaterally; No edema; Nontender calves or saphenous, No cyanosis, No clubbing.   Musculoskeletal: Good range of motion in all major joints. No tenderness to palpation or major deformities noted.   Neurologic: Alert & oriented x 4, clear speech    EKG  Results for orders placed or performed during the hospital encounter of 10/29/21   EKG   Result Value Ref Range    Report       [facility] Emergency [facility]    Test Date:  2021-10-29  Pt Name:    ABRAHAN THOMAS               Department: ER  MRN:        [id number]                      Room:       ED [facility]  Gender:     Female                       Technician: [id number]  :        1995                   Requested By:ER TRIAGE PROTOCOL  Order #:    [id number]                    Reading MD: Chinyere Ramirez    Measurements  Intervals                                Axis  Rate:       67                           P:          63  UT:         160                          QRS:        47  QRSD:       80                           T:          25  QT:         392  QTc:        414    Interpretive Statements  SINUS RHYTHM rate 67  Increased baseline wander  Normal axis  Normal intervals  No ST elevation or depression  ARTIFACT IN LEAD(S) I,II,III,aVR,aVL,aVF  No previous ECG available for comparison  Electronically Signed On 10- 0:04:44 PDT by Chinyere Ramirez       LABS/RADIOLOGY/PROCEDURES  Results for orders placed or performed during the hospital encounter of 10/29/21   CBC with Differential   Result Value Ref Range    WBC 9.2 4.8 - 10.8 K/uL    RBC 4.95 4.20 - 5.40 M/uL    Hemoglobin 15.2 12.0 - 16.0 g/dL    Hematocrit 45.2 37.0 - 47.0 %    MCV 91.3 81.4 - 97.8 fL    MCH 30.7 27.0 - 33.0 pg    MCHC 33.6 33.6 - 35.0 g/dL    RDW 43.2 35.9 - 50.0 fL     Platelet Count 369 164 - 446 K/uL    MPV 9.1 9.0 - 12.9 fL    Neutrophils-Polys 58.80 44.00 - 72.00 %    Lymphocytes 33.10 22.00 - 41.00 %    Monocytes 6.30 0.00 - 13.40 %    Eosinophils 1.00 0.00 - 6.90 %    Basophils 0.50 0.00 - 1.80 %    Immature Granulocytes 0.30 0.00 - 0.90 %    Nucleated RBC 0.00 /100 WBC    Neutrophils (Absolute) 5.37 2.00 - 7.15 K/uL    Lymphs (Absolute) 3.03 1.00 - 4.80 K/uL    Monos (Absolute) 0.58 0.00 - 0.85 K/uL    Eos (Absolute) 0.09 0.00 - 0.51 K/uL    Baso (Absolute) 0.05 0.00 - 0.12 K/uL    Immature Granulocytes (abs) 0.03 0.00 - 0.11 K/uL    NRBC (Absolute) 0.00 K/uL   Complete Metabolic Panel (CMP)   Result Value Ref Range    Sodium 137 135 - 145 mmol/L    Potassium 4.3 3.6 - 5.5 mmol/L    Chloride 102 96 - 112 mmol/L    Co2 24 20 - 33 mmol/L    Anion Gap 11.0 7.0 - 16.0    Glucose 85 65 - 99 mg/dL    Bun 13 8 - 22 mg/dL    Creatinine 0.62 0.50 - 1.40 mg/dL    Calcium 9.7 8.5 - 10.5 mg/dL    AST(SGOT) 27 12 - 45 U/L    ALT(SGPT) 53 (H) 2 - 50 U/L    Alkaline Phosphatase 152 (H) 30 - 99 U/L    Total Bilirubin 0.4 0.1 - 1.5 mg/dL    Albumin 4.6 3.2 - 4.9 g/dL    Total Protein 8.2 6.0 - 8.2 g/dL    Globulin 3.6 (H) 1.9 - 3.5 g/dL    A-G Ratio 1.3 g/dL   SARS-CoV-2 PCR (24 hour In-House): Collect NP swab in [facility]    Specimen: Nasopharyngeal; Respirate   Result Value Ref Range    SARS-CoV-2 Source NP Swab    HCG QUAL SERUM   Result Value Ref Range    Beta-Hcg Qualitative Serum Negative Negative   ESTIMATED GFR   Result Value Ref Range    GFR If African American >60 >60 mL/min/1.73 m 2    GFR If Non African American >60 >60 mL/min/1.73 m 2   EKG   Result Value Ref Range    Report       [facility] Emergency Dept.    Test Date:  2021-10-29  Pt Name:    ABRAHAN THOMAS               Department: ER  MRN:        [id number]                      Room:       ED [facility]  Gender:     Female                       Technician: [id number]  :        1995                   Requested  By:ER TRIAGE PROTOCOL  Order #:    [id number]                    Reading MD: Chinyere Ramirez    Measurements  Intervals                                Axis  Rate:       67                           P:          63  CA:         160                          QRS:        47  QRSD:       80                           T:          25  QT:         392  QTc:        414    Interpretive Statements  SINUS RHYTHM rate 67  Increased baseline wander  Normal axis  Normal intervals  No ST elevation or depression  ARTIFACT IN LEAD(S) I,II,III,aVR,aVL,aVF  No previous ECG available for comparison  Electronically Signed On 10- 0:04:44 PDT by Chinyere Ramirez         DX-CHEST-PORTABLE (1 VIEW)   Final Result         1.  No acute cardiopulmonary disease.          COURSE & MEDICAL DECISION MAKING  Pertinent Labs & Imaging studies reviewed. (See chart for details)        8:58 PM - Patient seen and examined at bedside. Discussed plan of care, including imaging and labs. Patient agrees to the plan of care. Ordered for dx-chest, CBC with differential, CMP, Troponin and EKG to evaluate her symptoms.     Patient presents to the ER complaining of nasal congestion, postnasal drip, cough, chest pain with cough, and occasional shortness of breath.  She was diagnosed with Covid and August 2021.  She says she got over her Covid illness.  2 weeks ago she developed nasal congestion and cough.  Symptoms lasted 3 days and then resolved.  3 days later she started coughing again.  That was a week ago.  Cough is nonproductive.  No hemoptysis.  She says she feels drainage going down the back of her throat.  Also causes her to cough.  Cough also gets worse when she lies flat.  Occasionally she'll get some green nasal discharge.  She has not had fever.  Her white count is normal.  She is not septic or toxic in appearance.  No headache.  No stiff neck.  No concern for meningitis.  Lung sounds are equal.  Vitals are normal.  O2 sats 98% on room air.  She is PE RC  negative.  Not think she has pulmonary embolism.  Chest x-ray is negative.  No evidence for pneumonia.  She may have some sinus issues which is causing postnasal drip and then results in cough.  However, with normal white count, symptoms are only week, and no fever, and I think the patient needs antibiotics.  I recommended supportive care using over-the-counter cough and cold remedies.  Given patient referral to [facility] as she just moved here from [place] and recently transition from [employer] to [employer].  He has not yet found a doctor.  Patient is not in any respiratory distress.  She is well-appearing.  I think she is safe and stable for outpatient management discharge home.  She has been given strict return precautions and discharge instructions for upper respiratory infection and she understands treatment plan and follow-up.    FINAL IMPRESSION  1. Upper respiratory tract infection, unspecified type Acute      This dictation has been created using voice recognition software. The accuracy of the dictation is limited by the abilities of the software. I expect there may be some errors of grammar and possibly content. I made every attempt to manually correct the errors within my dictation. However, errors related to voice recognition software may still exist and should be interpreted within the appropriate context.     Stacey JULIEN (Juan Jose), am scribing for, and in the presence of, Chinyere Ramirez M.D..    Electronically signed by: Stacey Navarro (Juan Jose), 10/29/2021    Chinyere JULIEN M.D. personally performed the services described in this documentation, as scribed by Stacey Navarro in my presence, and it is both accurate and complete.    The note accurately reflects work and decisions made by me.  Chinyere Ramirez M.D.  10/30/2021  12:23 AM

## 2022-06-02 ENCOUNTER — TELEPHONE (OUTPATIENT)
Dept: SCHEDULING | Facility: IMAGING CENTER | Age: 27
End: 2022-06-02

## 2022-06-08 ENCOUNTER — OFFICE VISIT (OUTPATIENT)
Dept: URGENT CARE | Facility: CLINIC | Age: 27
End: 2022-06-08
Payer: MEDICAID

## 2022-06-08 VITALS
DIASTOLIC BLOOD PRESSURE: 74 MMHG | TEMPERATURE: 98.7 F | SYSTOLIC BLOOD PRESSURE: 108 MMHG | BODY MASS INDEX: 27.21 KG/M2 | OXYGEN SATURATION: 97 % | HEART RATE: 78 BPM | WEIGHT: 135 LBS | HEIGHT: 59 IN | RESPIRATION RATE: 14 BRPM

## 2022-06-08 DIAGNOSIS — N92.6 MISSED MENSES: ICD-10-CM

## 2022-06-08 LAB
APPEARANCE UR: CLEAR
BILIRUB UR STRIP-MCNC: NEGATIVE MG/DL
COLOR UR AUTO: YELLOW
GLUCOSE UR STRIP.AUTO-MCNC: NEGATIVE MG/DL
INT CON NEG: NEGATIVE
INT CON POS: POSITIVE
KETONES UR STRIP.AUTO-MCNC: NEGATIVE MG/DL
LEUKOCYTE ESTERASE UR QL STRIP.AUTO: NEGATIVE
NITRITE UR QL STRIP.AUTO: NEGATIVE
PH UR STRIP.AUTO: 8.5 [PH] (ref 5–8)
POC URINE PREGNANCY TEST: NEGATIVE
PROT UR QL STRIP: NEGATIVE MG/DL
RBC UR QL AUTO: NEGATIVE
SP GR UR STRIP.AUTO: 1.02
UROBILINOGEN UR STRIP-MCNC: 1 MG/DL

## 2022-06-08 PROCEDURE — 81002 URINALYSIS NONAUTO W/O SCOPE: CPT | Performed by: NURSE PRACTITIONER

## 2022-06-08 PROCEDURE — 99203 OFFICE O/P NEW LOW 30 MIN: CPT | Performed by: NURSE PRACTITIONER

## 2022-06-08 PROCEDURE — 81025 URINE PREGNANCY TEST: CPT | Performed by: NURSE PRACTITIONER

## 2022-06-08 ASSESSMENT — FIBROSIS 4 INDEX: FIB4 SCORE: 0.26

## 2022-06-09 ASSESSMENT — ENCOUNTER SYMPTOMS
VOMITING: 0
DIZZINESS: 0
NAUSEA: 1
FLANK PAIN: 0
SORE THROAT: 0
SHORTNESS OF BREATH: 0
FEVER: 0
CHILLS: 0
EYE PAIN: 0
MYALGIAS: 0

## 2022-06-09 NOTE — PROGRESS NOTES
"Subjective:   Jyotsna Tellez is a 26 y.o. female who presents for Pregnancy Test (X 3 moths without a Menstruation.  4 Hcg home test were Positive. )      HPI  Patient is a 26-year-old female presents urgent care with request of an hCG test patient states that her last menstrual cycle she believes was 3 months ago.  She is taking 4 at home pregnancy test which have been positive.  Patient has been trying to get pregnant is not on any form of birth control.  She denies any vaginal bleeding, abdominal cramping, abdominal pain.  She feels nauseous with fatigue.  Review of Systems   Constitutional: Positive for malaise/fatigue. Negative for chills and fever.   HENT: Negative for sore throat.    Eyes: Negative for pain.   Respiratory: Negative for shortness of breath.    Cardiovascular: Negative for chest pain.   Gastrointestinal: Positive for nausea. Negative for vomiting.   Genitourinary: Negative for dysuria, flank pain, frequency, hematuria and urgency.        Missed menses   Musculoskeletal: Negative for myalgias.   Skin: Negative for rash.   Neurological: Negative for dizziness.       Medications:    • This patient does not have an active medication from one of the medication groupers.    Allergies: Morphine    Problem List: Jyotsna Tellez does not have a problem list on file.    Surgical History:  No past surgical history on file.    Past Social Hx: Jyotsna Tellez  reports that she has quit smoking. Her smoking use included cigarettes. She smoked 0.25 packs per day. She has never used smokeless tobacco. She reports current alcohol use. She reports current drug use. Drug: Inhaled.     Past Family Hx:  Jyotsna Tellez family history is not on file.     Problem list, medications, and allergies reviewed by myself today in Epic.     Objective:     /74   Pulse 78   Temp 37.1 °C (98.7 °F) (Temporal)   Resp 14   Ht 1.499 m (4' 11\")   Wt 61.2 kg (135 lb)   LMP 04/01/2022   SpO2 97%   BMI " 27.27 kg/m²     Physical Exam  Vitals and nursing note reviewed.   Constitutional:       General: She is not in acute distress.     Appearance: She is well-developed.   HENT:      Head: Normocephalic and atraumatic.      Right Ear: Tympanic membrane and external ear normal.      Left Ear: Tympanic membrane and external ear normal.      Nose: Nose normal.      Right Sinus: No maxillary sinus tenderness or frontal sinus tenderness.      Left Sinus: No maxillary sinus tenderness or frontal sinus tenderness.      Mouth/Throat:      Mouth: Mucous membranes are moist.      Pharynx: Uvula midline. No posterior oropharyngeal erythema.      Tonsils: No tonsillar exudate or tonsillar abscesses.   Eyes:      General:         Right eye: No discharge.         Left eye: No discharge.      Conjunctiva/sclera: Conjunctivae normal.   Cardiovascular:      Rate and Rhythm: Normal rate.   Pulmonary:      Effort: Pulmonary effort is normal. No respiratory distress.      Breath sounds: Normal breath sounds.   Abdominal:      General: Bowel sounds are normal. There is no distension.      Tenderness: There is no abdominal tenderness.   Musculoskeletal:         General: Normal range of motion.   Skin:     General: Skin is warm and dry.   Neurological:      General: No focal deficit present.      Mental Status: She is alert and oriented to person, place, and time. Mental status is at baseline.      Gait: Gait (gait at baseline) normal.   Psychiatric:         Judgment: Judgment normal.         Assessment/Plan:     Diagnosis and associated orders:     1. Missed menses  POCT Pregnancy    POCT Urinalysis    HCG QUANTITATIVE      Comments/MDM:     Results for orders placed or performed in visit on 06/08/22   POCT Pregnancy   Result Value Ref Range    POC Urine Pregnancy Test Negative Negative    Internal Control Positive Positive     Internal Control Negative Negative    POCT Urinalysis   Result Value Ref Range    POC Color Yellow Negative    POC  Appearance Clear Negative    POC Leukocyte Esterase Negative Negative    POC Nitrites Negative Negative    POC Urobiligen 1.0 Negative (0.2) mg/dL    POC Protein Negative Negative mg/dL    POC Urine PH 8.5 (A) 5.0 - 8.0    POC Blood Negative Negative    POC Specific Gravity 1.020 <1.005 - >1.030    POC Ketones Negative Negative mg/dL    POC Bilirubin Negative Negative mg/dL    POC Glucose Negative Negative mg/dL •     • Patient is a 26-year-old female present with stated above.  Evidently patient having multiple at home positive pregnancy test on today's exam hCG is negative.  Will obtain lab work for further evaluation.  • Differential diagnosis, natural history, supportive care, and indications for immediate follow-up discussed.                Please note that this dictation was created using voice recognition software. I have made a reasonable attempt to correct obvious errors, but I expect that there are errors of grammar and possibly content that I did not discover before finalizing the note.    This note was electronically signed by Daren BORDEN.

## 2022-06-10 ENCOUNTER — TELEPHONE (OUTPATIENT)
Dept: URGENT CARE | Facility: CLINIC | Age: 27
End: 2022-06-10

## 2022-08-30 ENCOUNTER — TELEPHONE (OUTPATIENT)
Dept: SCHEDULING | Facility: IMAGING CENTER | Age: 27
End: 2022-08-30

## 2024-04-11 ENCOUNTER — HOSPITAL ENCOUNTER (OUTPATIENT)
Dept: RADIOLOGY | Facility: MEDICAL CENTER | Age: 29
End: 2024-04-11
Attending: STUDENT IN AN ORGANIZED HEALTH CARE EDUCATION/TRAINING PROGRAM
Payer: MEDICAID

## 2024-12-27 ENCOUNTER — APPOINTMENT (OUTPATIENT)
Dept: RADIOLOGY | Facility: MEDICAL CENTER | Age: 29
End: 2024-12-27
Attending: EMERGENCY MEDICINE
Payer: MEDICAID

## 2024-12-27 ENCOUNTER — HOSPITAL ENCOUNTER (EMERGENCY)
Facility: MEDICAL CENTER | Age: 29
End: 2024-12-27
Attending: EMERGENCY MEDICINE
Payer: MEDICAID

## 2024-12-27 VITALS
TEMPERATURE: 98.2 F | HEART RATE: 99 BPM | DIASTOLIC BLOOD PRESSURE: 55 MMHG | BODY MASS INDEX: 27.87 KG/M2 | WEIGHT: 138.01 LBS | RESPIRATION RATE: 17 BRPM | OXYGEN SATURATION: 98 % | SYSTOLIC BLOOD PRESSURE: 123 MMHG

## 2024-12-27 DIAGNOSIS — S93.402A SPRAIN OF LEFT ANKLE, UNSPECIFIED LIGAMENT, INITIAL ENCOUNTER: ICD-10-CM

## 2024-12-27 DIAGNOSIS — J03.90 TONSILLITIS: ICD-10-CM

## 2024-12-27 LAB — S PYO DNA SPEC NAA+PROBE: NOT DETECTED

## 2024-12-27 PROCEDURE — 87651 STREP A DNA AMP PROBE: CPT

## 2024-12-27 PROCEDURE — A9270 NON-COVERED ITEM OR SERVICE: HCPCS | Mod: UD | Performed by: EMERGENCY MEDICINE

## 2024-12-27 PROCEDURE — 99283 EMERGENCY DEPT VISIT LOW MDM: CPT

## 2024-12-27 PROCEDURE — 73610 X-RAY EXAM OF ANKLE: CPT | Mod: LT

## 2024-12-27 PROCEDURE — 700102 HCHG RX REV CODE 250 W/ 637 OVERRIDE(OP): Mod: UD | Performed by: EMERGENCY MEDICINE

## 2024-12-27 RX ORDER — IBUPROFEN 600 MG/1
600 TABLET, FILM COATED ORAL ONCE
Status: COMPLETED | OUTPATIENT
Start: 2024-12-27 | End: 2024-12-27

## 2024-12-27 RX ORDER — DEXAMETHASONE 4 MG/1
8 TABLET ORAL ONCE
Status: COMPLETED | OUTPATIENT
Start: 2024-12-27 | End: 2024-12-27

## 2024-12-27 RX ORDER — IBUPROFEN 200 MG
200 TABLET ORAL EVERY 6 HOURS PRN
COMMUNITY

## 2024-12-27 RX ADMIN — IBUPROFEN 600 MG: 600 TABLET, FILM COATED ORAL at 19:11

## 2024-12-27 RX ADMIN — DEXAMETHASONE 8 MG: 4 TABLET ORAL at 19:11

## 2024-12-27 ASSESSMENT — PAIN DESCRIPTION - PAIN TYPE: TYPE: ACUTE PAIN

## 2024-12-28 NOTE — ED PROVIDER NOTES
CHIEF COMPLAINT  Chief Complaint   Patient presents with    Headache     X3 days, gets better with ibuprofen    Body Aches     X3 days    Ankle Pain     Missed a step and L ankle is bruised       LIMITATION TO HISTORY   None    HPI    Jyotsna Tellez is a 29 y.o. female with 2 complaints  1 is being sick.  She was told by her  to come in to get checked out as she been sick  Been 3 days  Discussed the body aches malaise  Headache.  Gets better initially the ibuprofen.  There is no nausea there is positive sore throat.  There is slight beginning of left ear ache.  There is no cough.  There is no vomiting no diarrhea no dysuria no urgency    In addition she has ankle pains apparently missed a step she inverted her left ankle she has pain on the medial portion of her ankle.  She is able to walk on it.  Is no associate numbness or tingling distally there is some bruising and swelling    OUTSIDE HISTORIAN(S):  None    EXTERNAL RECORDS REVIEWED  None appreciated    REVIEW OF SYSTEMS  No fevers today    PAST MEDICAL HISTORY  History reviewed. No pertinent past medical history.    FAMILY HISTORY  History reviewed. No pertinent family history.    SOCIAL HISTORY  Social History     Tobacco Use    Smoking status: Former     Current packs/day: 0.25     Types: Cigarettes    Smokeless tobacco: Never   Vaping Use    Vaping status: Former   Substance Use Topics    Alcohol use: Not Currently    Drug use: Yes     Types: Inhaled     Comment: marijuana     Social History     Substance and Sexual Activity   Drug Use Yes    Types: Inhaled    Comment: marijuana       SURGICAL HISTORY  History reviewed. No pertinent surgical history.    CURRENT MEDICATIONS  No current facility-administered medications for this encounter.    Current Outpatient Medications:     ibuprofen (MOTRIN) 200 MG Tab, Take 200 mg by mouth every 6 hours as needed., Disp: , Rfl:     ALLERGIES  Allergies   Allergen Reactions    Morphine Rash     Rash        PHYSICAL EXAM  VITAL SIGNS: /83   Pulse (!) 115   Temp 36.6 °C (97.8 °F) (Temporal)   Resp 18   Wt 62.6 kg (138 lb 0.1 oz)   LMP 12/06/2024 (Approximate)   SpO2 99%   BMI 27.87 kg/m²   Reviewed and noted she is not febrile she is slightly tachycardic  Constitutional: Well developed, Well nourished, no acute distress..  HENT: Normocephalic, atraumatic, bilateral external ears normal, No intraoral erythema, edema, exudate.  Tympanic membranes are clear bilaterally no effusion erythema.  Oropharynx and tonsils appear not enlarged but with some mild exudate  Eyes: PERRLA, conjunctiva pink, no scleral icterus.   Cardiovascular: Regular rate and rhythm. No murmurs, rubs or gallops.  No dependent edema or calf tenderness  Respiratory: Lungs clear to auscultation bilaterally. No wheezes, rales, or rhonchi.  Abdominal:  Abdomen soft, non-tender, non distended. No rebound, or guarding.    Skin: No erythema, no rash. No wounds or bruising.  Genitourinary: No costovertebral angle tenderness.   Musculoskeletal: Her left ankle is slightly swollen.  She is no tenderness on the lateral malleolus she has tenderness on the medial malleolus\ and no tenderness in the base of the fifth metatarsal  Neurologic: Alert, no facial droop noted. All extra ocular muscles intact. Moves all extremities with out weakness noted  Psychiatric: Affect normal, Judgment normal, Mood normal.         MEDICAL DECISION MAKING:  PROBLEMS EVALUATED THIS VISIT:  Sore throat.  Fever, headache this been duration for 3 days has not wanted her checked here she has no fever slightly tachycardic minimal exudate on the throat with no other findings.  Ankle pain inverted her ankle able to ambulate on it tenderness on the medial malleolus neurovascular intact distally    Medical decision making strep throat, other pharyngitis syndromes viral possible less likely peritonsillar abscess or otitis media or mastoid infection or meningitis the patient has  no exhibit any hard signs at this time.  As for her ankle the patient cannot be ruled out for fracture the patient has tenderness on the medial malleolus.  Therefore cannot use Griggs ankle rules to rule out         PLAN:  X-ray of ankle cannot rule out by Griggs ankle rules  Strep test  Decadron      Diagnostic tests and prescription drugs considered including, but not limited to: Select: Sitter lab test but at this point the patient looked well and nontoxic.    Escalation of care considered, and ultimately not performed: Looked well tachycardia resolved.     Barriers to care at this time, including but not limited to: Select: None.     RESULTS    LABS Ordered and Reviewed by Me:  Results for orders placed or performed during the hospital encounter of 12/27/24   Group A Strep by PCR    Collection Time: 12/27/24  7:00 PM    Specimen: Throat   Result Value Ref Range    Group A Strep by PCR Not Detected Not Detected           ED COURSE:    ED Observation Status? No   No noted need for observation for developing issue    INTERVENTIONS BY ME:  Medications   ibuprofen (Motrin) tablet 600 mg (600 mg Oral Given 12/27/24 1911)   dexamethasone (Decadron) tablet 8 mg (8 mg Oral Given 12/27/24 1911)       Response on recheck:  Stable.        FINAL DISPO PLAN     In summary this is a 29-year female been sick for 3 days.  With sore throat slightly ear pain  Tachycardia that resolved on exam patient had some slight exudate on her tonsils but no signs of uvular shift or peritonsillar swelling.  She is strep test was negative she responded well to Motrin and Decadron.  X-ray of her ankle cannot be ruled out clinically was negative.  Patient was told to come back in 2 days if not better come back anytime if symptoms worsen.  Presumptive viral illness was given with good instructions to return if symptoms change or develop new symptoms.      Followup:  [facility], Emergency Dept  [address]  [id number]  [phone]  Go to   If symptoms worsen or not better in 2 days      CONDITION: Improved.     FINAL IMPRESSION  1. Sprain of left ankle, unspecified ligament, initial encounter    2. Tonsillitis

## 2024-12-28 NOTE — ED TRIAGE NOTES
Ambulates to triage  Chief Complaint   Patient presents with    Headache     X3 days, gets better with ibuprofen    Body Aches     X3 days    Ankle Pain     Missed a step and L ankle is bruised     Pt is not coughing, no fever in triage, last took ibuprofen yesterday.